# Patient Record
(demographics unavailable — no encounter records)

---

## 2025-03-12 NOTE — HISTORY OF PRESENT ILLNESS
[FreeTextEntry1] : Establishment of care [de-identified] : Diastolic hypertension 160/92 BMI 40 disabled   fibromyalgia

## 2025-03-12 NOTE — HEALTH RISK ASSESSMENT
[0] : 2) Feeling down, depressed, or hopeless: Not at all (0) [PHQ-2 Negative - No further assessment needed] : PHQ-2 Negative - No further assessment needed [[id number]] : 0

## 2025-03-12 NOTE — PLAN
Detail Level: Zone
[FreeTextEntry1] : 40-year-old female presents to establish health care at this facility Well-developed well-nourished endomorphic female BMI 40 Non-smoker/ex alcohol user Lives by herself forms completed for the ability to manage and handle declines Fibromyalgia-became disabled secondary to fibromyalgia and is in the process of self feeling Diastolic hypertension-192 metabolic syndrome-BMI greater than 40 diet and exercise discussed Asthma-albuterol minidose inhaler for rescue is provided Physical therapy and chiropractic consultations are administered